# Patient Record
Sex: FEMALE | Race: WHITE | URBAN - METROPOLITAN AREA
[De-identification: names, ages, dates, MRNs, and addresses within clinical notes are randomized per-mention and may not be internally consistent; named-entity substitution may affect disease eponyms.]

---

## 2019-01-04 ENCOUNTER — OFFICE VISIT (OUTPATIENT)
Dept: URGENT CARE | Facility: URGENT CARE | Age: 29
End: 2019-01-04
Payer: COMMERCIAL

## 2019-01-04 ENCOUNTER — ANCILLARY PROCEDURE (OUTPATIENT)
Dept: GENERAL RADIOLOGY | Facility: CLINIC | Age: 29
End: 2019-01-04
Payer: COMMERCIAL

## 2019-01-04 VITALS
DIASTOLIC BLOOD PRESSURE: 90 MMHG | SYSTOLIC BLOOD PRESSURE: 110 MMHG | HEIGHT: 66 IN | OXYGEN SATURATION: 96 % | HEART RATE: 86 BPM | WEIGHT: 210 LBS | BODY MASS INDEX: 33.75 KG/M2

## 2019-01-04 DIAGNOSIS — M25.571 PAIN IN JOINT INVOLVING ANKLE AND FOOT, RIGHT: Primary | ICD-10-CM

## 2019-01-04 DIAGNOSIS — M25.571 PAIN IN JOINT INVOLVING ANKLE AND FOOT, RIGHT: ICD-10-CM

## 2019-01-04 PROCEDURE — 73610 X-RAY EXAM OF ANKLE: CPT | Mod: RT

## 2019-01-04 PROCEDURE — 99214 OFFICE O/P EST MOD 30 MIN: CPT | Performed by: FAMILY MEDICINE

## 2019-01-04 ASSESSMENT — MIFFLIN-ST. JEOR: SCORE: 1689.3

## 2019-01-04 ASSESSMENT — PAIN SCALES - GENERAL: PAINLEVEL: SEVERE PAIN (6)

## 2019-01-04 NOTE — PATIENT INSTRUCTIONS
1. Ice  Twice per day  2. Range of motion start tomorrrow.  3. Wean crutches.   4. If still pain in 1 week have rechecked.

## 2019-01-04 NOTE — LETTER
[facility]  [address] [address]  Phone: [phone]    January 4, 2019        Sandhya Burnett  [address] [facility] [address]  [facility] [address]          To whom it may concern:    RE: Sandhya Burnett    Patient was seen and treated today at our clinic. Unable to work secondary to injury.    Please contact me for questions or concerns.      Sincerely,        Rolando Bowser MD

## 2019-01-04 NOTE — PROGRESS NOTES
S: Sandhya Burnett is a 28 year old female who complains of inversion injury to the left ankle 1 hours ago. There is pain and swelling at the lateral aspect of that ankle. The patient was not able to bear weight directly after the injury.    0: She appears well, vital signs are normal. There is swelling and tenderness over the lateral malleolus. No tenderness over the medial aspect of the ankle. The fifth metatarsal is not tender. The ankle joint is intact without excessive opening on stressing. She is not complaining X-Ray shows fracture to be absent. The rest of the foot, ankle and leg exam is normal.    This is a sanguinous broken in the past secondary to her hockey injury of which she had a few    A: Sprain of ankle, contusion.    P: Rest and elevate the injured ankle, apply ice intermittently. Use crutches without weight bearing until able to comfortable bear partial weight, then progress to full weight bearing as tolerated. ACE bandage applied. Dynamic ankle splint dispensed. See prn.    Level 4 visit; 25 minutes in exam and counseling.  Greater than 50% of the time in counseling regards to her injury.    Treatment including medication  Therapy including range of motion exercises  And review of her hardware, crutches and splint    Also discussed her follow-up in 7 days if still having some pain.  We discussed with her weaning down on her crutches so that by 1 we should be without crutches and ambulating very well.  I would still use the splint for support and wean this off over a longer period of time